# Patient Record
Sex: FEMALE | Race: WHITE | NOT HISPANIC OR LATINO | Employment: OTHER | URBAN - METROPOLITAN AREA
[De-identification: names, ages, dates, MRNs, and addresses within clinical notes are randomized per-mention and may not be internally consistent; named-entity substitution may affect disease eponyms.]

---

## 2019-07-21 ENCOUNTER — HOSPITAL ENCOUNTER (EMERGENCY)
Facility: HOSPITAL | Age: 54
Discharge: HOME | End: 2019-07-21
Attending: EMERGENCY MEDICINE | Admitting: EMERGENCY MEDICINE

## 2019-07-21 ENCOUNTER — APPOINTMENT (OUTPATIENT)
Dept: GENERAL RADIOLOGY | Facility: HOSPITAL | Age: 54
End: 2019-07-21

## 2019-07-21 VITALS
BODY MASS INDEX: 21.57 KG/M2 | RESPIRATION RATE: 14 BRPM | TEMPERATURE: 98.8 F | DIASTOLIC BLOOD PRESSURE: 73 MMHG | HEIGHT: 62 IN | OXYGEN SATURATION: 98 % | HEART RATE: 76 BPM | SYSTOLIC BLOOD PRESSURE: 131 MMHG | WEIGHT: 117.2 LBS

## 2019-07-21 DIAGNOSIS — S42.351A CLOSED DISPLACED COMMINUTED FRACTURE OF SHAFT OF RIGHT HUMERUS, INITIAL ENCOUNTER: Primary | ICD-10-CM

## 2019-07-21 PROCEDURE — 25010000002 HYDROMORPHONE PER 4 MG: Performed by: EMERGENCY MEDICINE

## 2019-07-21 PROCEDURE — 73060 X-RAY EXAM OF HUMERUS: CPT

## 2019-07-21 PROCEDURE — 25010000002 ONDANSETRON PER 1 MG: Performed by: EMERGENCY MEDICINE

## 2019-07-21 PROCEDURE — 96375 TX/PRO/DX INJ NEW DRUG ADDON: CPT

## 2019-07-21 PROCEDURE — 96376 TX/PRO/DX INJ SAME DRUG ADON: CPT

## 2019-07-21 PROCEDURE — 25010000002 HYDROMORPHONE 1 MG/ML SOLUTION: Performed by: EMERGENCY MEDICINE

## 2019-07-21 PROCEDURE — 99284 EMERGENCY DEPT VISIT MOD MDM: CPT

## 2019-07-21 PROCEDURE — 96374 THER/PROPH/DIAG INJ IV PUSH: CPT

## 2019-07-21 RX ORDER — ONDANSETRON 4 MG/1
4 TABLET, ORALLY DISINTEGRATING ORAL ONCE
Status: COMPLETED | OUTPATIENT
Start: 2019-07-21 | End: 2019-07-21

## 2019-07-21 RX ORDER — OXYCODONE HYDROCHLORIDE AND ACETAMINOPHEN 5; 325 MG/1; MG/1
TABLET ORAL
Qty: 20 TABLET | Refills: 0 | Status: SHIPPED | OUTPATIENT
Start: 2019-07-21 | End: 2022-12-01

## 2019-07-21 RX ORDER — HYDROMORPHONE HYDROCHLORIDE 1 MG/ML
0.5 INJECTION, SOLUTION INTRAMUSCULAR; INTRAVENOUS; SUBCUTANEOUS ONCE
Status: COMPLETED | OUTPATIENT
Start: 2019-07-21 | End: 2019-07-21

## 2019-07-21 RX ORDER — ONDANSETRON 4 MG/1
4 TABLET, ORALLY DISINTEGRATING ORAL EVERY 8 HOURS PRN
Qty: 15 TABLET | Refills: 0 | Status: SHIPPED | OUTPATIENT
Start: 2019-07-21

## 2019-07-21 RX ORDER — ONDANSETRON 2 MG/ML
4 INJECTION INTRAMUSCULAR; INTRAVENOUS ONCE
Status: COMPLETED | OUTPATIENT
Start: 2019-07-21 | End: 2019-07-21

## 2019-07-21 RX ORDER — SODIUM CHLORIDE 0.9 % (FLUSH) 0.9 %
10 SYRINGE (ML) INJECTION AS NEEDED
Status: DISCONTINUED | OUTPATIENT
Start: 2019-07-21 | End: 2019-07-21 | Stop reason: HOSPADM

## 2019-07-21 RX ORDER — OXYCODONE HYDROCHLORIDE AND ACETAMINOPHEN 5; 325 MG/1; MG/1
1 TABLET ORAL ONCE
Status: COMPLETED | OUTPATIENT
Start: 2019-07-21 | End: 2019-07-21

## 2019-07-21 RX ADMIN — HYDROMORPHONE HYDROCHLORIDE 1 MG: 1 INJECTION, SOLUTION INTRAMUSCULAR; INTRAVENOUS; SUBCUTANEOUS at 02:06

## 2019-07-21 RX ADMIN — HYDROMORPHONE HYDROCHLORIDE 0.5 MG: 1 INJECTION, SOLUTION INTRAMUSCULAR; INTRAVENOUS; SUBCUTANEOUS at 02:46

## 2019-07-21 RX ADMIN — ONDANSETRON HYDROCHLORIDE 4 MG: 2 SOLUTION INTRAMUSCULAR; INTRAVENOUS at 01:43

## 2019-07-21 RX ADMIN — ONDANSETRON 4 MG: 4 TABLET, ORALLY DISINTEGRATING ORAL at 00:43

## 2019-07-21 RX ADMIN — HYDROMORPHONE HYDROCHLORIDE 0.5 MG: 1 INJECTION, SOLUTION INTRAMUSCULAR; INTRAVENOUS; SUBCUTANEOUS at 03:06

## 2019-07-21 RX ADMIN — OXYCODONE HYDROCHLORIDE AND ACETAMINOPHEN 1 TABLET: 5; 325 TABLET ORAL at 00:43

## 2019-07-21 RX ADMIN — HYDROMORPHONE HYDROCHLORIDE 1 MG: 1 INJECTION, SOLUTION INTRAMUSCULAR; INTRAVENOUS; SUBCUTANEOUS at 01:42

## 2022-03-06 ENCOUNTER — HOSPITAL ENCOUNTER (EMERGENCY)
Facility: HOSPITAL | Age: 57
Discharge: HOME | End: 2022-03-06
Attending: EMERGENCY MEDICINE | Admitting: EMERGENCY MEDICINE

## 2022-03-06 ENCOUNTER — APPOINTMENT (OUTPATIENT)
Dept: GENERAL RADIOLOGY | Facility: HOSPITAL | Age: 57
End: 2022-03-06

## 2022-03-06 VITALS
WEIGHT: 110 LBS | RESPIRATION RATE: 16 BRPM | BODY MASS INDEX: 20.77 KG/M2 | OXYGEN SATURATION: 98 % | HEIGHT: 61 IN | DIASTOLIC BLOOD PRESSURE: 44 MMHG | SYSTOLIC BLOOD PRESSURE: 125 MMHG | TEMPERATURE: 98.6 F | HEART RATE: 70 BPM

## 2022-03-06 DIAGNOSIS — M79.601 RIGHT ARM PAIN: Primary | ICD-10-CM

## 2022-03-06 DIAGNOSIS — R20.2 PARESTHESIA OF RIGHT ARM: ICD-10-CM

## 2022-03-06 PROCEDURE — 73060 X-RAY EXAM OF HUMERUS: CPT

## 2022-03-06 PROCEDURE — 99283 EMERGENCY DEPT VISIT LOW MDM: CPT

## 2022-03-06 RX ORDER — ACETAMINOPHEN 500 MG
1000 TABLET ORAL ONCE
Status: COMPLETED | OUTPATIENT
Start: 2022-03-06 | End: 2022-03-06

## 2022-03-06 RX ADMIN — ACETAMINOPHEN 1000 MG: 500 TABLET ORAL at 16:03

## 2022-03-06 NOTE — ED TRIAGE NOTES
Pt broke right arm in 4 places 3 years ago.  Her arm is misshapped and she reports she has started having more pain and numbness    Patient was placed in face mask during first look triage.  Patient was wearing a face mask throughout encounter.  I wore personal protective equipment throughout the encounter.  Hand hygiene was performed before and after patient encounter.

## 2022-03-06 NOTE — DISCHARGE INSTRUCTIONS
Call Dr. Bell's office tomorrow.  Return to the emergency department for worsening symptoms, weakness in your right arm, or other concern.

## 2022-03-06 NOTE — ED NOTES
This RN in appropriate ppe while in pt room. Pt wearing mask.        Violette Pinto, RN  03/06/22 2603

## 2022-03-06 NOTE — EXTERNAL PATIENT INSTRUCTIONS
Patient Education   Table of Contents       Paresthesia     To view videos and all your education online visit,   [email or web address]   or scan this QR code with your smartphone.                  Paresthesia     Paresthesia is an abnormal burning or prickling sensation. It is usually felt in the hands, arms, legs, or feet. However, it may occur in any part of the body. Usually, paresthesia is not painful. It may feel like:       Tingling or numbness.       Buzzing.       Itching.      Paresthesia may occur without any clear cause, or it may be caused by:       Breathing too quickly (hyperventilation).       Pressure on a nerve.       An underlying medical condition.       Side effects of a medication.       Nutritional deficiencies.       Exposure to toxic chemicals.     Most people experience temporary (transient) paresthesia at some time in their lives. For some people, it may be long-lasting (chronic) because of an underlying medical condition. If you have paresthesia that lasts a long time, you need to be evaluated by your health care provider.   Follow these instructions at home:   Alcohol use           Do not  drink alcohol if:       Your health care provider tells you not to drink.       You are pregnant, may be pregnant, or are planning to become pregnant.      If you drink alcohol:      Limit how much you use to:       0?1 drink a day for women.       0?2 drinks a day for men.       Be aware of how much alcohol is in your drink. In the [place] one drink equals one 12 oz bottle of beer (355 mL), one 5 oz glass of wine (148 mL), or one 1? oz glass of hard liquor (44 mL).     Nutrition           Eat a healthy diet. This includes:       Eating foods that are high in fiber, such as fresh fruits and vegetables, whole grains, and beans.       Limiting foods that are high in fat and processed sugars, such as fried or sweet foods.       General instructions         Take over-the-counter and prescription  medicines only as told by your health care provider.      Do not  use any products that contain nicotine or tobacco, such as cigarettes and e-cigarettes. These can keep blood from reaching damaged nerves. If you need help quitting, ask your health care provider.       If you have diabetes, work closely with your health care provider to keep your blood sugar under control.      If you have numbness in your feet:       Check every day for signs of injury or infection. Watch for redness, warmth, and swelling.       Wear padded socks and comfortable shoes. These help protect your feet.       Keep all follow-up visits as told by your health care provider. This is important.       Contact a health care provider if you:         Have paresthesia that gets worse or does not go away.       Have numbness after an injury.       Have a burning or prickling feeling that gets worse when you walk.       Have pain, cramps, or dizziness, or you faint.       Develop a rash.     Get help right away if you:         Feel muscle weakness.       Develop new weakness in an arm or leg.       Have trouble walking or moving.       Have problems with speech, understanding, or vision.       Feel confused.       Cannot control your bladder or bowel movements.     Summary         Paresthesia is an abnormal burning or prickling sensation that is usually felt in the hands, arms, legs, or feet. It may also occur in other parts of the body.       Paresthesia may occur without any clear cause, or it may be caused by breathing too quickly (hyperventilation), pressure on a nerve, an underlying medical condition, side effects of a medication, nutritional deficiencies, or exposure to toxic chemicals.       If you have paresthesia that lasts a long time, you need to be evaluated by your health care provider.     This information is not intended to replace advice given to you by your health care provider. Make sure you discuss any questions you have with  your health care provider.     Document Released: 12/08/2003Document Revised: 09/28/2021Document Reviewed: 09/28/2021     Elsevier Patient Education ? 2021 [employer]

## 2022-03-06 NOTE — ED PROVIDER NOTES
" EMERGENCY DEPARTMENT ENCOUNTER    Room Number:  35/35  Date of encounter:  3/6/2022  PCP: Waledmar King MD  Historian: Patient     I used full protective equipment while examining this patient.  This includes face mask, gloves and protective eyewear.  I washed my hands before entering the room and immediately upon leaving the room.  Patient was wearing a surgical mask.      HPI:  Chief Complaint: Right arm pain  A complete HPI/ROS/PMH/PSH/SH/FH are unobtainable due to: None    Context: Sharmin Ren is a 56 y.o. female who presents to the ED c/o worsening right arm pain.  Patient broke her right humerus in July 2019.  She wore an immobilizer for approximately 8 months but never had surgery.  She states her arm \"did not heal properly\".  She has had chronic pain since then but reports worsening pain over the past few weeks.  She also reports some numbness/tingling in her right hand for the past few days.  The numbness is waxing and waning.  She is right-handed.  Denies recent injury, neck pain, chest pain, or shortness of breath.      PAST MEDICAL HISTORY  Active Ambulatory Problems     Diagnosis Date Noted   • No Active Ambulatory Problems     Resolved Ambulatory Problems     Diagnosis Date Noted   • No Resolved Ambulatory Problems     No Additional Past Medical History         PAST SURGICAL HISTORY  History reviewed. No pertinent surgical history.      FAMILY HISTORY  History reviewed. No pertinent family history.      SOCIAL HISTORY  Social History     Socioeconomic History   • Marital status:          ALLERGIES  Penicillins       REVIEW OF SYSTEMS  Review of Systems      All systems have been reviewed and are negative except as as discussed in the HPI    PHYSICAL EXAM    I have reviewed the triage vital signs and nursing notes.    ED Triage Vitals   Temp Heart Rate Resp BP SpO2   03/06/22 1409 03/06/22 1409 03/06/22 1409 03/06/22 1413 03/06/22 1409   98.6 °F (37 °C) 86 16 144/63 99 %      Temp src " Heart Rate Source Patient Position BP Location FiO2 (%)   03/06/22 1409 03/06/22 1409 03/06/22 1413 03/06/22 1413 --   Tympanic Monitor Lying Left arm        Physical Exam  GENERAL: Awake, alert, oriented x3.  Well-developed, well-nourished female.  Resting comfortably no acute distress.  HENT: NCAT, nares patent  NECK: supple  EYES: no scleral icterus  CV: regular rhythm, regular rate, normal radial and ulnar pulses bilaterally, brisk cap refill in the right fingers  RESPIRATORY: normal effort, clear to auscultation bilaterally  ABDOMEN: soft, nontender  MUSCULOSKELETAL: There is an obvious deformity of the right humerus.  There is mild tenderness over the right mid humerus.  Remainder of the right arm is nontender.  There is decreased range of motion of the right shoulder (chronic and unchanged per patient).  Right arm is not swollen.  There is no cellulitis or warmth.  NEURO: Normal strength and light touch sensation in the right upper extremity.  Brisk cap refill in the right fingers.  Equal  strength bilaterally.  SKIN: warm, dry, no rash  PSYCH: Normal mood and affect    .    LAB RESULTS  No results found for this or any previous visit (from the past 24 hour(s)).    Ordered the above labs and independently reviewed the results.      RADIOLOGY  XR Humerus Right    Result Date: 3/6/2022  TWO-VIEW RIGHT HUMERUS  HISTORY: Previous fracture of humerus. Pain.  FINDINGS:  There is extensive old fracture deformity just above the mid shaft of the humerus with prominent healing callus formation. The fracture healing is with considerable lateral bowing at the fracture site that appears more prominent compared to the acute phase imaging of 07/21/2019. There is no history of recent trauma and the remainder of the humerus is unremarkable.  This report was finalized on 3/6/2022 4:03 PM by Dr. Schuyler Joya M.D.        I ordered the above noted radiological studies. Reviewed by me and discussed with radiologist.  See  dictation for official radiology interpretation.      PROCEDURES  Procedures      MEDICATIONS GIVEN IN ER    Medications   acetaminophen (TYLENOL) tablet 1,000 mg (1,000 mg Oral Given 3/6/22 1603)         PROGRESS, DATA ANALYSIS, CONSULTS, AND MEDICAL DECISION MAKING    All labs have been independently reviewed by me.  All radiology studies have been reviewed by me and discussed with radiologist dictating the report.   EKG's independently viewed and interpreted by me.  I have reviewed the nurse's notes, vital signs, past medical history, and medication list.  Discussion below represents my analysis of pertinent findings related to patient's condition, differential diagnosis, treatment plan and final disposition.      ED Course as of 03/06/22 1633   Sun Mar 06, 2022   1443 Old records reviewed.  Patient was seen here in July 2019 for a comminuted fracture of the right humerus. [WH]   1533 Right humerus x-rays interpreted by the radiologist.  Images are not reviewed by me.  There is an extensive old fracture deformity of the proximal right humerus.  Fragments are overriding by approximately 5 cm.  There is callus formation.  No acute fracture.  See dictated report for details. [WH]   1603 I showed the patient her x-rays and discussed the results with her.  Patient will be referred to orthopedics for follow-up. [WH]      ED Course User Index  [WH] Shoaib Quintero MD       AS OF 16:33 EST VITALS:    BP - 125/44  HR - 70  TEMP - 98.6 °F (37 °C) (Tympanic)  O2 SATS - 98%      DIAGNOSIS  Final diagnoses:   Right arm pain   Paresthesia of right arm         DISPOSITION  Discharge    DISCHARGE    Patient discharged in stable condition.    Reviewed implications of results, diagnosis, meds, responsibility to follow up, warning signs and symptoms of possible worsening, potential complications and reasons to return to ER, including worsening symptoms, weakness or swelling in her right arm, or other concern..    Patient/Family  voiced understanding of above instructions.    Discussed plan for discharge, as there is no emergent indication for admission. Patient referred to primary care provider for BP management due to today's BP. Pt/family is agreeable and understands need for follow up and repeat testing.  Pt is aware that discharge does not mean that nothing is wrong but it indicates no emergency is present that requires admission and they must continue care with follow-up as given below or physician of their choice.     FOLLOW-UP  Verónica Bell MD  [address] [facility] [address] [facility] [address]  [place] [address]  [phone]    Call in 1 day  Old right humerus fracture that did not heal properly         Medication List      No changes were made to your prescriptions during this visit.           Dictated utilizing Dragon dictation:  Much of this encounter note is an electronic transcription/translation of spoken language to printed text. The electronic translation of spoken language may permit erroneous, or at times, nonsensical words or phrases to be inadvertently transcribed; Although I have reviewed the note for such errors, some may still exist.     Shoaib Quintero MD  03/06/22 5285       Shoaib Quintero MD  03/06/22 5802

## 2022-12-01 ENCOUNTER — OFFICE VISIT (OUTPATIENT)
Dept: FAMILY MEDICINE CLINIC | Facility: CLINIC | Age: 57
End: 2022-12-01

## 2022-12-01 VITALS
HEIGHT: 61 IN | DIASTOLIC BLOOD PRESSURE: 74 MMHG | BODY MASS INDEX: 20.77 KG/M2 | TEMPERATURE: 97.8 F | WEIGHT: 110 LBS | SYSTOLIC BLOOD PRESSURE: 132 MMHG | OXYGEN SATURATION: 99 % | HEART RATE: 79 BPM

## 2022-12-01 DIAGNOSIS — D64.9 ANEMIA, UNSPECIFIED TYPE: ICD-10-CM

## 2022-12-01 DIAGNOSIS — Z12.11 COLON CANCER SCREENING: ICD-10-CM

## 2022-12-01 DIAGNOSIS — Z00.00 ENCOUNTER FOR ANNUAL HEALTH EXAMINATION: Primary | ICD-10-CM

## 2022-12-01 DIAGNOSIS — F41.1 GAD (GENERALIZED ANXIETY DISORDER): ICD-10-CM

## 2022-12-01 DIAGNOSIS — Z78.0 POST-MENOPAUSAL: ICD-10-CM

## 2022-12-01 PROBLEM — S42.309A CLOSED FRACTURE OF SHAFT OF HUMERUS: Status: ACTIVE | Noted: 2022-06-15

## 2022-12-01 PROBLEM — Z13.6 ENCOUNTER FOR LIPID SCREENING FOR CARDIOVASCULAR DISEASE: Status: ACTIVE | Noted: 2022-12-01

## 2022-12-01 PROBLEM — Z13.220 ENCOUNTER FOR LIPID SCREENING FOR CARDIOVASCULAR DISEASE: Status: ACTIVE | Noted: 2022-12-01

## 2022-12-01 PROCEDURE — 99386 PREV VISIT NEW AGE 40-64: CPT | Performed by: FAMILY MEDICINE

## 2022-12-01 RX ORDER — DOXYCYCLINE HYCLATE 50 MG/1
324 CAPSULE, GELATIN COATED ORAL DAILY
COMMUNITY

## 2022-12-01 RX ORDER — MONTELUKAST SODIUM 10 MG/1
10 TABLET ORAL AS NEEDED
COMMUNITY
Start: 2022-08-27

## 2022-12-01 RX ORDER — CALCIUM CARBONATE/VITAMIN D3 500MG-5MCG
1 TABLET ORAL DAILY
COMMUNITY

## 2022-12-01 RX ORDER — ESTRADIOL 0.5 MG/1
0.5 TABLET ORAL DAILY
COMMUNITY
Start: 2022-11-30

## 2022-12-01 RX ORDER — DIPHENOXYLATE HYDROCHLORIDE AND ATROPINE SULFATE 2.5; .025 MG/1; MG/1
1 TABLET ORAL DAILY
COMMUNITY

## 2022-12-01 RX ORDER — PROGESTERONE 100 MG/1
100 CAPSULE ORAL DAILY
COMMUNITY
Start: 2022-11-30

## 2022-12-01 RX ORDER — FLUTICASONE PROPIONATE 50 MCG
1 SPRAY, SUSPENSION (ML) NASAL AS NEEDED
COMMUNITY
Start: 2022-08-27

## 2022-12-01 RX ORDER — BUPROPION HYDROCHLORIDE 300 MG/1
300 TABLET ORAL DAILY
COMMUNITY
Start: 2022-09-30

## 2022-12-01 RX ORDER — FLUOXETINE HYDROCHLORIDE 20 MG/1
20 CAPSULE ORAL DAILY
COMMUNITY
Start: 2022-11-16

## 2022-12-01 RX ORDER — AZELASTINE HYDROCHLORIDE 137 UG/1
1 SPRAY, METERED NASAL AS NEEDED
COMMUNITY
Start: 2022-08-27

## 2022-12-01 RX ORDER — OXYCODONE AND ACETAMINOPHEN 7.5; 325 MG/1; MG/1
1 TABLET ORAL AS NEEDED
COMMUNITY
Start: 2022-11-28 | End: 2022-12-28

## 2022-12-01 NOTE — PROGRESS NOTES
Patient here for annual physical exam    Subjective   Sharmin Ren is a 57 y.o. female.     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    The following portions of the patient's history were reviewed and updated as appropriate: allergies, current medications, past family history, past medical history, past social history, past surgical history and problem list    Dentist: [facility].  Last colonoscopy: Pt never has had.   Pap; just done.    Optometry:needed.  Last PSA(if applicable):  Last mammo(if applicable):utd    Immunization History   Administered Date(s) Administered   • COVID-19 (PFIZER) PURPLE CAP 04/19/2021, 05/10/2021   • Tdap 07/11/2022       Review of Systems   Constitutional: Negative for appetite change and fatigue.   HENT: Negative for nosebleeds and sore throat.    Eyes: Negative for blurred vision and visual disturbance.   Respiratory: Negative for shortness of breath and wheezing.    Cardiovascular: Negative for chest pain and leg swelling.   Gastrointestinal: Negative for abdominal distention and abdominal pain.   Endocrine: Negative for cold intolerance and polyuria.   Genitourinary: Negative for dysuria and hematuria.   Musculoskeletal: Negative for arthralgias and myalgias.   Skin: Negative for color change and rash.   Neurological: Negative for weakness and confusion.   Psychiatric/Behavioral: Negative for agitation and depressed mood.       Patient Active Problem List   Diagnosis   • Encounter for annual health examination   • EDYTA (generalized anxiety disorder)   • Closed fracture of shaft of humerus   • Post-menopausal   • Encounter for lipid screening for cardiovascular disease       Allergies   Allergen Reactions   • Penicillins Shortness Of Breath   • Wasp Venom Swelling     Throat Swells         Current Outpatient Medications:   •  Azelastine HCl 137 MCG/SPRAY solution, 1 spray into the nostril(s) as directed by provider As Needed., Disp: , Rfl:   •  buPROPion XL (WELLBUTRIN XL)  300 MG 24 hr tablet, Take 300 mg by mouth Daily., Disp: , Rfl:   •  Calcium Carb-Cholecalciferol 500-5 MG-MCG tablet per tablet, Take 1 tablet by mouth Daily., Disp: , Rfl:   •  estradiol (ESTRACE) 0.5 MG tablet, Take 0.5 mg by mouth Daily., Disp: , Rfl:   •  ferrous gluconate (FERGON) 324 MG tablet, Take 324 mg by mouth Daily., Disp: , Rfl:   •  FLUoxetine (PROzac) 20 MG capsule, Take 20 mg by mouth Daily., Disp: , Rfl:   •  fluticasone (FLONASE) 50 MCG/ACT nasal spray, 1 spray into the nostril(s) as directed by provider As Needed., Disp: , Rfl:   •  montelukast (SINGULAIR) 10 MG tablet, Take 10 mg by mouth As Needed for Other. When sick, Disp: , Rfl:   •  multivitamin (THERAGRAN) tablet tablet, Take 1 tablet by mouth Daily., Disp: , Rfl:   •  ondansetron ODT (ZOFRAN ODT) 4 MG disintegrating tablet, Take 1 tablet by mouth Every 8 (Eight) Hours As Needed for Nausea., Disp: 15 tablet, Rfl: 0  •  oxyCODONE-acetaminophen (PERCOCET) 7.5-325 MG per tablet, Take 1 tablet by mouth As Needed., Disp: , Rfl:   •  Progesterone (PROMETRIUM) 100 MG capsule, Take 100 mg by mouth Daily., Disp: , Rfl:     History reviewed. No pertinent past medical history.    No past surgical history on file.    History reviewed. No pertinent family history.    Social History     Tobacco Use   • Smoking status: Never   • Smokeless tobacco: Never   Substance Use Topics   • Alcohol use: Not on file            Objective     Vitals:    12/01/22 1116   BP: 132/74   Pulse: 79   Temp: 97.8 °F (36.6 °C)   SpO2: 99%     Body mass index is 20.8 kg/m².    Physical Exam  Vitals reviewed.   Constitutional:       Appearance: She is well-developed. She is not diaphoretic.   HENT:      Head: Normocephalic and atraumatic.   Eyes:      General: No scleral icterus.     Pupils: Pupils are equal, round, and reactive to light.   Neck:      Thyroid: No thyromegaly.   Cardiovascular:      Rate and Rhythm: Normal rate and regular rhythm.      Heart sounds: No murmur  heard.    No friction rub. No gallop.   Pulmonary:      Effort: Pulmonary effort is normal. No respiratory distress.      Breath sounds: No wheezing or rales.   Chest:      Chest wall: No tenderness.   Abdominal:      General: Bowel sounds are normal. There is no distension.      Palpations: Abdomen is soft.      Tenderness: There is no abdominal tenderness.   Musculoskeletal:         General: No deformity. Normal range of motion.   Lymphadenopathy:      Cervical: No cervical adenopathy.   Skin:     General: Skin is warm and dry.      Findings: No rash.   Neurological:      Cranial Nerves: No cranial nerve deficit.      Motor: No abnormal muscle tone.         No results found for: GLUCOSE, BUN, CREATININE, EGFRIFNONA, EGFRIFAFRI, BCR, K, CO2, CALCIUM, PROTENTOTREF, ALBUMIN, LABIL2, BILIRUBIN, AST, ALT    Hemoglobin   Date Value Ref Range Status   11/21/2022 12.7 12.0 - 16.0 g/dL Final     Hematocrit   Date Value Ref Range Status   11/21/2022 36.0 36.0 - 46.0 % Final       No results found for: HGBA1C    No results found for: RYPFFEAC53    No results found for: TSH    No results found for: CHOL  No results found for: TRIG  No results found for: HDL  No results found for: LDL  No results found for: VLDL  No results found for: LDLHDL      Procedures    Assessment & Plan   Problems Addressed this Visit     Encounter for annual health examination - Primary    Encounter for lipid screening for cardiovascular disease    EDYTA (generalized anxiety disorder)    Relevant Medications    FLUoxetine (PROzac) 20 MG capsule    buPROPion XL (WELLBUTRIN XL) 300 MG 24 hr tablet    Post-menopausal   Other Visit Diagnoses     Anemia, unspecified type        Relevant Medications    ferrous gluconate (FERGON) 324 MG tablet      Diagnoses       Codes Comments    Encounter for annual health examination    -  Primary ICD-10-CM: Z00.00  ICD-9-CM: V70.0     EDYTA (generalized anxiety disorder)     ICD-10-CM: F41.1  ICD-9-CM: 300.02      Post-menopausal     ICD-10-CM: Z78.0  ICD-9-CM: V49.81     Colon cancer screening     ICD-10-CM: Z12.11  ICD-9-CM: V76.51     Anemia, unspecified type     ICD-10-CM: D64.9  ICD-9-CM: 285.9         Anemia. Hg noted to be 10 on 11/22 labs with normal mcv.  Check Fe, ferritin, CBC, TSh.    Preventive Counseling:  Encouraged to stay active.  Covid vaccine UTD.  HEP A UTD.  Pneumovax UTD.  Cologuard ordered.  Dexa ordered. Check CMP, FLP.      Dentist UTD.  Optho recommended.     No orders of the defined types were placed in this encounter.      Current Outpatient Medications   Medication Sig Dispense Refill   • Azelastine HCl 137 MCG/SPRAY solution 1 spray into the nostril(s) as directed by provider As Needed.     • buPROPion XL (WELLBUTRIN XL) 300 MG 24 hr tablet Take 300 mg by mouth Daily.     • Calcium Carb-Cholecalciferol 500-5 MG-MCG tablet per tablet Take 1 tablet by mouth Daily.     • estradiol (ESTRACE) 0.5 MG tablet Take 0.5 mg by mouth Daily.     • ferrous gluconate (FERGON) 324 MG tablet Take 324 mg by mouth Daily.     • FLUoxetine (PROzac) 20 MG capsule Take 20 mg by mouth Daily.     • fluticasone (FLONASE) 50 MCG/ACT nasal spray 1 spray into the nostril(s) as directed by provider As Needed.     • montelukast (SINGULAIR) 10 MG tablet Take 10 mg by mouth As Needed for Other. When sick     • multivitamin (THERAGRAN) tablet tablet Take 1 tablet by mouth Daily.     • ondansetron ODT (ZOFRAN ODT) 4 MG disintegrating tablet Take 1 tablet by mouth Every 8 (Eight) Hours As Needed for Nausea. 15 tablet 0   • oxyCODONE-acetaminophen (PERCOCET) 7.5-325 MG per tablet Take 1 tablet by mouth As Needed.     • Progesterone (PROMETRIUM) 100 MG capsule Take 100 mg by mouth Daily.       No current facility-administered medications for this visit.       Return in about 6 months (around 6/1/2023).    There are no Patient Instructions on file for this visit.

## 2022-12-02 LAB
ALBUMIN SERPL-MCNC: 4.5 G/DL (ref 3.5–5.2)
ALBUMIN/GLOB SERPL: 2 G/DL
ALP SERPL-CCNC: 160 U/L (ref 39–117)
ALT SERPL-CCNC: 47 U/L (ref 1–33)
AST SERPL-CCNC: 32 U/L (ref 1–32)
BASOPHILS # BLD AUTO: 0.05 10*3/MM3 (ref 0–0.2)
BASOPHILS NFR BLD AUTO: 0.7 % (ref 0–1.5)
BILIRUB SERPL-MCNC: 0.3 MG/DL (ref 0–1.2)
BUN SERPL-MCNC: 11 MG/DL (ref 6–20)
BUN/CREAT SERPL: 16.4 (ref 7–25)
CALCIUM SERPL-MCNC: 9.8 MG/DL (ref 8.6–10.5)
CHLORIDE SERPL-SCNC: 103 MMOL/L (ref 98–107)
CHOLEST SERPL-MCNC: 230 MG/DL (ref 0–200)
CHOLEST/HDLC SERPL: 3.83 {RATIO}
CO2 SERPL-SCNC: 25.8 MMOL/L (ref 22–29)
CREAT SERPL-MCNC: 0.67 MG/DL (ref 0.57–1)
EGFRCR SERPLBLD CKD-EPI 2021: 102.7 ML/MIN/1.73
EOSINOPHIL # BLD AUTO: 0.1 10*3/MM3 (ref 0–0.4)
EOSINOPHIL NFR BLD AUTO: 1.4 % (ref 0.3–6.2)
ERYTHROCYTE [DISTWIDTH] IN BLOOD BY AUTOMATED COUNT: 11.6 % (ref 12.3–15.4)
FERRITIN SERPL-MCNC: 305 NG/ML (ref 13–150)
GLOBULIN SER CALC-MCNC: 2.3 GM/DL
GLUCOSE SERPL-MCNC: 87 MG/DL (ref 65–99)
HCT VFR BLD AUTO: 33.4 % (ref 34–46.6)
HDLC SERPL-MCNC: 60 MG/DL (ref 40–60)
HGB BLD-MCNC: 11.5 G/DL (ref 12–15.9)
IMM GRANULOCYTES # BLD AUTO: 0.04 10*3/MM3 (ref 0–0.05)
IMM GRANULOCYTES NFR BLD AUTO: 0.6 % (ref 0–0.5)
IRON SERPL-MCNC: 110 MCG/DL (ref 37–145)
LDLC SERPL CALC-MCNC: 149 MG/DL (ref 0–100)
LYMPHOCYTES # BLD AUTO: 2.18 10*3/MM3 (ref 0.7–3.1)
LYMPHOCYTES NFR BLD AUTO: 31.5 % (ref 19.6–45.3)
MCH RBC QN AUTO: 32.7 PG (ref 26.6–33)
MCHC RBC AUTO-ENTMCNC: 34.4 G/DL (ref 31.5–35.7)
MCV RBC AUTO: 94.9 FL (ref 79–97)
MONOCYTES # BLD AUTO: 0.57 10*3/MM3 (ref 0.1–0.9)
MONOCYTES NFR BLD AUTO: 8.2 % (ref 5–12)
NEUTROPHILS # BLD AUTO: 3.97 10*3/MM3 (ref 1.7–7)
NEUTROPHILS NFR BLD AUTO: 57.6 % (ref 42.7–76)
NRBC BLD AUTO-RTO: 0 /100 WBC (ref 0–0.2)
PLATELET # BLD AUTO: 500 10*3/MM3 (ref 140–450)
POTASSIUM SERPL-SCNC: 5.1 MMOL/L (ref 3.5–5.2)
PROT SERPL-MCNC: 6.8 G/DL (ref 6–8.5)
RBC # BLD AUTO: 3.52 10*6/MM3 (ref 3.77–5.28)
SODIUM SERPL-SCNC: 138 MMOL/L (ref 136–145)
TRIGL SERPL-MCNC: 116 MG/DL (ref 0–150)
TSH SERPL DL<=0.005 MIU/L-ACNC: 3.96 UIU/ML (ref 0.27–4.2)
VIT B12 SERPL-MCNC: >2000 PG/ML (ref 211–946)
VLDLC SERPL CALC-MCNC: 21 MG/DL (ref 5–40)
WBC # BLD AUTO: 6.91 10*3/MM3 (ref 3.4–10.8)

## 2024-12-31 ENCOUNTER — TELEPHONE (OUTPATIENT)
Dept: FAMILY MEDICINE CLINIC | Facility: CLINIC | Age: 59
End: 2024-12-31

## 2024-12-31 DIAGNOSIS — Z01.818 PREOPERATIVE CLEARANCE: Primary | ICD-10-CM

## 2024-12-31 NOTE — TELEPHONE ENCOUNTER
Caller: Sharmin Ren    Relationship: Self    Best call back number: [phone]     What is the medical concern/diagnosis: CARDIAC CLEARANCE FOR SURGERY ON 01/3/25    OR IF CARDIAC CLEARANCE CAN BE DONE AT THE OFFICE CAN THE PATIENT BE CALLED TO SCHEDULE    What specialty or service is being requested: CARDIOLOGY      Any additional details: PATIENTS ANESTHESIOLOGIST HAD DECIDED THEY WANT A CARDIAC CLEARANCE FOR HER SURGERY.     PATIENT WOULD NEED TO GET IN TO CARDIOLOGIST BEFORE 1/3/25    PLEASE CALL TO ADVISE

## 2025-01-02 ENCOUNTER — OFFICE VISIT (OUTPATIENT)
Dept: CARDIOLOGY | Facility: CLINIC | Age: 60
End: 2025-01-02
Payer: COMMERCIAL

## 2025-01-02 VITALS
HEIGHT: 60 IN | SYSTOLIC BLOOD PRESSURE: 140 MMHG | WEIGHT: 111 LBS | HEART RATE: 67 BPM | BODY MASS INDEX: 21.79 KG/M2 | DIASTOLIC BLOOD PRESSURE: 82 MMHG

## 2025-01-02 DIAGNOSIS — Z01.810 PREOP CARDIOVASCULAR EXAM: Primary | ICD-10-CM

## 2025-01-02 DIAGNOSIS — Z87.74 S/P PERCUTANEOUS PATENT FORAMEN OVALE CLOSURE: ICD-10-CM

## 2025-01-02 DIAGNOSIS — R03.0 ELEVATED BLOOD PRESSURE READING IN OFFICE WITHOUT DIAGNOSIS OF HYPERTENSION: ICD-10-CM

## 2025-01-02 PROCEDURE — 93000 ELECTROCARDIOGRAM COMPLETE: CPT | Performed by: STUDENT IN AN ORGANIZED HEALTH CARE EDUCATION/TRAINING PROGRAM

## 2025-01-02 PROCEDURE — 99204 OFFICE O/P NEW MOD 45 MIN: CPT | Performed by: STUDENT IN AN ORGANIZED HEALTH CARE EDUCATION/TRAINING PROGRAM

## 2025-01-02 NOTE — PROGRESS NOTES
[facility]    Subjective:     Encounter Date:01/02/25      Patient ID: Sharmin Ren is a 59 y.o. female.    Chief Complaint:   Chief Complaint   Patient presents with    Clearance      History of Present Illness    Sharmin Ren is a 59 y.o. lady who presents for further evaluation.  She has a past history of what sounds to be a TIA episode in the late 1990s, and ultimately underwent evaluation which revealed a PFO.  She underwent a transcutaneous PFO closure with Dr. Miller at that time.  She subsequently did well.  She has no other cardiac complaints.  She had a close displaced spiral fracture of the right humerus.  She was seeing orthopedic surgery for this.  She underwent an ORIFOf the right humerus in November 2022.  She tolerated the procedure well.  Her initial fracture event was in 2019.    She presents today for further cardiac evaluation prior to undergoing an elective breast augmentation and c section scar repair/cosmetic surgery.  She has no cardiac complaints.  She is active, functional, and has no exertional shortness of breath.  She is able to exceed 4 METS of physical exertion without any functional imitations.  She has no palpitations otherwise feels well.  She is no longer on antiplatelets    Previous Cardiac Testing:  PFO closure in the late 1990's    The following portions of the patient's history were reviewed and updated as appropriate: allergies, current medications, past family history, past medical history, past social history, past surgical history and problem list.    History reviewed. No pertinent past medical history.    History reviewed. No pertinent surgical history.        ECG 12 Lead    Date/Time: 1/2/2025 8:15 AM  Performed by: Yohannes Worrell MD    Authorized by: Yohannes Worrell MD  Comparison: not compared with previous ECG   Previous ECG: no previous ECG available  Rhythm: sinus rhythm  Rate: normal  Conduction: conduction normal  ST Segments: ST segments  "normal  ST Flattening: V6, V5 and III  T Waves: T waves normal  QRS axis: normal  Other: no other findings    Clinical impression: normal ECG  Comments: Short [place] interval, otherwise normal ECG.  There is no evidence of delta waves or other pathology noted.  Borderline nonspecific ST segment changes noted.             Objective:     Vitals:    01/02/25 0811   BP: 140/82   BP Location: Left arm   Patient Position: Sitting   Cuff Size: Adult   Pulse: 67   Weight: 50.3 kg (111 lb)   Height: 152.4 cm (60\")         Constitutional:       Appearance: Healthy appearance. Not in distress.   Neck:      Vascular: JVD normal.   Pulmonary:      Effort: Pulmonary effort is normal.      Breath sounds: Normal breath sounds.   Cardiovascular:      PMI at left midclavicular line. Normal rate. Regular rhythm. Normal S2.       Murmurs: There is no murmur.   Pulses:     Intact distal pulses.   Edema:     Peripheral edema absent.   Skin:     General: Skin is warm and dry.   Neurological:      General: No focal deficit present.      Mental Status: Alert, oriented to person, place, and time and oriented to person, place and time.   Psychiatric:         Mood and Affect: Mood and affect normal.         Lab Review:       BUN   Date Value Ref Range Status   12/01/2022 11 6 - 20 mg/dL Final   11/14/2022 14 10 - 20 mg/dL Final     Creatinine   Date Value Ref Range Status   12/01/2022 0.67 0.57 - 1.00 mg/dL Final   11/14/2022 0.64 0.55 - 1.02 mg/dL Final     Potassium   Date Value Ref Range Status   12/01/2022 5.1 3.5 - 5.2 mmol/L Final   11/14/2022 4 3.5 - 5.1 mmol/L Final     ALT (SGPT)   Date Value Ref Range Status   12/01/2022 47 (H) 1 - 33 U/L Final     AST (SGOT)   Date Value Ref Range Status   12/01/2022 32 1 - 32 U/L Final         Performed        Assessment:          Diagnosis Plan   1. Preop cardiovascular exam  ECG 12 Lead      2. Elevated blood pressure reading in office without diagnosis of hypertension  ECG 12 Lead      3. S/P " percutaneous patent foramen ovale closure  Adult Transthoracic Echo Complete w/ Color, Spectral and Contrast if necessary per protocol             Plan:         Preoperative cardiovascular risk assessment for upcoming cosmetic surgery: She is low risk for preoperative M ACE.  She is functional, active, and her ECG is normal.  She does not meet guideline recommendations for additional cardiac testing at this time.  She may proceed with surgery without additional cardiac testing.  She is not on anticoagulants.  History of PFO closure: Sounds that this was a TIA-related event.  Operative details are not available to review due to age.  Will arrange for an echocardiogram to establish a baseline.  If it looks good, then given how long ago the PFO closure was I doubt she will need further echocardiograms we will see what the echo shows.  This echo has no bearing on her surgical risk.     Thank you for allowing me to participate in the care of Sharmin Ren. Feel free to contact me directly with any further questions or concerns.    RTC 1 year, echo in the interim    Yohannes Worrell MD  [facility]  01/02/25  08:11 EST       Current Outpatient Medications:     buPROPion XL (WELLBUTRIN XL) 300 MG 24 hr tablet, Take 300 mg by mouth Daily., Disp: , Rfl:     estradiol (ESTRACE) 0.5 MG tablet, Take 0.5 mg by mouth Daily., Disp: , Rfl:     Progesterone (PROMETRIUM) 100 MG capsule, Take 100 mg by mouth Daily., Disp: , Rfl:          Return in about 1 year (around 1/2/2026).      Part of this note may be an electronic transcription/translation of spoken language to printed text using the Dragon Dictation System.

## 2025-01-02 NOTE — LETTER
January 2, 2025     Ryan Hill MD  [address] [address]  [place] [address]    Patient: Sharmin Ren   YOB: 1965   Date of Visit: 1/2/2025     Dear Ryan Hill MD:       Thank you for referring Sharmin Ren to me for evaluation. Below are the relevant portions of my assessment and plan of care.    If you have questions, please do not hesitate to call me. I look forward to following Sharmin along with you.         Sincerely,        Yohannes Worrell MD        CC: No Recipients    Yohannes Worrell MD  01/02/25 0835  Signed        [facility]    Subjective:     Encounter Date:01/02/25      Patient ID: Sharmin Ren is a 59 y.o. female.    Chief Complaint:   Chief Complaint   Patient presents with   • Clearance      History of Present Illness    Sharmin Ren is a 59 y.o. lady who presents for further evaluation.  She has a past history of what sounds to be a TIA episode in the late 1990s, and ultimately underwent evaluation which revealed a PFO.  She underwent a transcutaneous PFO closure with Dr. Miller at that time.  She subsequently did well.  She has no other cardiac complaints.  She had a close displaced spiral fracture of the right humerus.  She was seeing orthopedic surgery for this.  She underwent an ORIFOf the right humerus in November 2022.  She tolerated the procedure well.  Her initial fracture event was in 2019.    She presents today for further cardiac evaluation prior to undergoing an elective breast augmentation and c section scar repair/cosmetic surgery.  She has no cardiac complaints.  She is active, functional, and has no exertional shortness of breath.  She is able to exceed 4 METS of physical exertion without any functional imitations.  She has no palpitations otherwise feels well.  She is no longer on antiplatelets    Previous Cardiac Testing:  PFO closure in the late 1990's    The following portions of the patient's history were reviewed and updated as  "appropriate: allergies, current medications, past family history, past medical history, past social history, past surgical history and problem list.    History reviewed. No pertinent past medical history.    History reviewed. No pertinent surgical history.        ECG 12 Lead    Date/Time: 1/2/2025 8:15 AM  Performed by: Yohannes Worrell MD    Authorized by: Yohannes Worrell MD  Comparison: not compared with previous ECG   Previous ECG: no previous ECG available  Rhythm: sinus rhythm  Rate: normal  Conduction: conduction normal  ST Segments: ST segments normal  ST Flattening: V6, V5 and III  T Waves: T waves normal  QRS axis: normal  Other: no other findings    Clinical impression: normal ECG  Comments: Short [place] interval, otherwise normal ECG.  There is no evidence of delta waves or other pathology noted.  Borderline nonspecific ST segment changes noted.             Objective:     Vitals:    01/02/25 0811   BP: 140/82   BP Location: Left arm   Patient Position: Sitting   Cuff Size: Adult   Pulse: 67   Weight: 50.3 kg (111 lb)   Height: 152.4 cm (60\")         Constitutional:       Appearance: Healthy appearance. Not in distress.   Neck:      Vascular: JVD normal.   Pulmonary:      Effort: Pulmonary effort is normal.      Breath sounds: Normal breath sounds.   Cardiovascular:      PMI at left midclavicular line. Normal rate. Regular rhythm. Normal S2.       Murmurs: There is no murmur.   Pulses:     Intact distal pulses.   Edema:     Peripheral edema absent.   Skin:     General: Skin is warm and dry.   Neurological:      General: No focal deficit present.      Mental Status: Alert, oriented to person, place, and time and oriented to person, place and time.   Psychiatric:         Mood and Affect: Mood and affect normal.         Lab Review:       BUN   Date Value Ref Range Status   12/01/2022 11 6 - 20 mg/dL Final   11/14/2022 14 10 - 20 mg/dL Final     Creatinine   Date Value Ref Range Status   12/01/2022 0.67 0.57 - 1.00 " mg/dL Final   11/14/2022 0.64 0.55 - 1.02 mg/dL Final     Potassium   Date Value Ref Range Status   12/01/2022 5.1 3.5 - 5.2 mmol/L Final   11/14/2022 4 3.5 - 5.1 mmol/L Final     ALT (SGPT)   Date Value Ref Range Status   12/01/2022 47 (H) 1 - 33 U/L Final     AST (SGOT)   Date Value Ref Range Status   12/01/2022 32 1 - 32 U/L Final         Performed        Assessment:          Diagnosis Plan   1. Preop cardiovascular exam  ECG 12 Lead      2. Elevated blood pressure reading in office without diagnosis of hypertension  ECG 12 Lead      3. S/P percutaneous patent foramen ovale closure  Adult Transthoracic Echo Complete w/ Color, Spectral and Contrast if necessary per protocol             Plan:         Preoperative cardiovascular risk assessment for upcoming cosmetic surgery: She is low risk for preoperative M ACE.  She is functional, active, and her ECG is normal.  She does not meet guideline recommendations for additional cardiac testing at this time.  She may proceed with surgery without additional cardiac testing.  She is not on anticoagulants.  History of PFO closure: Sounds that this was a TIA-related event.  Operative details are not available to review due to age.  Will arrange for an echocardiogram to establish a baseline.  If it looks good, then given how long ago the PFO closure was I doubt she will need further echocardiograms we will see what the echo shows.  This echo has no bearing on her surgical risk.     Thank you for allowing me to participate in the care of Sharmin Ren. Feel free to contact me directly with any further questions or concerns.    RTC 1 year, echo in the interim    Yohannes Worrell MD  [facility]  01/02/25  08:11 EST       Current Outpatient Medications:   •  buPROPion XL (WELLBUTRIN XL) 300 MG 24 hr tablet, Take 300 mg by mouth Daily., Disp: , Rfl:   •  estradiol (ESTRACE) 0.5 MG tablet, Take 0.5 mg by mouth Daily., Disp: , Rfl:   •  Progesterone (PROMETRIUM) 100 MG  capsule, Take 100 mg by mouth Daily., Disp: , Rfl:          Return in about 1 year (around 1/2/2026).      Part of this note may be an electronic transcription/translation of spoken language to printed text using the Dragon Dictation System.

## 2025-01-02 NOTE — LETTER
January 2, 2025     [facility]  [address] [facility] [id number]    Patient: Sharmin Ren   YOB: 1965   Date of Visit: 1/2/2025     Dear [facility]:       Thank you for referring Sharmin Ren to me for evaluation. Below are the relevant portions of my assessment and plan of care.    If you have questions, please do not hesitate to call me. I look forward to following Sharmin along with you.         Sincerely,        Yohannes Worrell MD        CC: MD Gm Robles, Yohannes HUTCHINS MD  01/02/25 0835  Sign when Signing Visit        [facility]    Subjective:     Encounter Date:01/02/25      Patient ID: Sharmin Ren is a 59 y.o. female.    Chief Complaint:   Chief Complaint   Patient presents with   • Clearance      History of Present Illness    Sharmin Ren is a 59 y.o. lady who presents for further evaluation.  She has a past history of what sounds to be a TIA episode in the late 1990s, and ultimately underwent evaluation which revealed a PFO.  She underwent a transcutaneous PFO closure with Dr. Miller at that time.  She subsequently did well.  She has no other cardiac complaints.  She had a close displaced spiral fracture of the right humerus.  She was seeing orthopedic surgery for this.  She underwent an ORIFOf the right humerus in November 2022.  She tolerated the procedure well.  Her initial fracture event was in 2019.    She presents today for further cardiac evaluation prior to undergoing an elective breast augmentation and c section scar repair/cosmetic surgery.  She has no cardiac complaints.  She is active, functional, and has no exertional shortness of breath.  She is able to exceed 4 METS of physical exertion without any functional imitations.  She has no palpitations otherwise feels well.  She is no longer on antiplatelets    Previous Cardiac Testing:  PFO closure in the late 1990's    The  "following portions of the patient's history were reviewed and updated as appropriate: allergies, current medications, past family history, past medical history, past social history, past surgical history and problem list.    History reviewed. No pertinent past medical history.    History reviewed. No pertinent surgical history.        ECG 12 Lead    Date/Time: 1/2/2025 8:15 AM  Performed by: Yohannes Worrell MD    Authorized by: Yohannes Worrell MD  Comparison: not compared with previous ECG   Previous ECG: no previous ECG available  Rhythm: sinus rhythm  Rate: normal  Conduction: conduction normal  ST Segments: ST segments normal  ST Flattening: V6, V5 and III  T Waves: T waves normal  QRS axis: normal  Other: no other findings    Clinical impression: normal ECG  Comments: Short [place] interval, otherwise normal ECG.  There is no evidence of delta waves or other pathology noted.  Borderline nonspecific ST segment changes noted.             Objective:     Vitals:    01/02/25 0811   BP: 140/82   BP Location: Left arm   Patient Position: Sitting   Cuff Size: Adult   Pulse: 67   Weight: 50.3 kg (111 lb)   Height: 152.4 cm (60\")         Constitutional:       Appearance: Healthy appearance. Not in distress.   Neck:      Vascular: JVD normal.   Pulmonary:      Effort: Pulmonary effort is normal.      Breath sounds: Normal breath sounds.   Cardiovascular:      PMI at left midclavicular line. Normal rate. Regular rhythm. Normal S2.       Murmurs: There is no murmur.   Pulses:     Intact distal pulses.   Edema:     Peripheral edema absent.   Skin:     General: Skin is warm and dry.   Neurological:      General: No focal deficit present.      Mental Status: Alert, oriented to person, place, and time and oriented to person, place and time.   Psychiatric:         Mood and Affect: Mood and affect normal.         Lab Review:       BUN   Date Value Ref Range Status   12/01/2022 11 6 - 20 mg/dL Final   11/14/2022 14 10 - 20 mg/dL Final "     Creatinine   Date Value Ref Range Status   12/01/2022 0.67 0.57 - 1.00 mg/dL Final   11/14/2022 0.64 0.55 - 1.02 mg/dL Final     Potassium   Date Value Ref Range Status   12/01/2022 5.1 3.5 - 5.2 mmol/L Final   11/14/2022 4 3.5 - 5.1 mmol/L Final     ALT (SGPT)   Date Value Ref Range Status   12/01/2022 47 (H) 1 - 33 U/L Final     AST (SGOT)   Date Value Ref Range Status   12/01/2022 32 1 - 32 U/L Final         Performed        Assessment:          Diagnosis Plan   1. Preop cardiovascular exam  ECG 12 Lead      2. Elevated blood pressure reading in office without diagnosis of hypertension  ECG 12 Lead      3. S/P percutaneous patent foramen ovale closure  Adult Transthoracic Echo Complete w/ Color, Spectral and Contrast if necessary per protocol             Plan:         Preoperative cardiovascular risk assessment for upcoming cosmetic surgery: She is low risk for preoperative M ACE.  She is functional, active, and her ECG is normal.  She does not meet guideline recommendations for additional cardiac testing at this time.  She may proceed with surgery without additional cardiac testing.  She is not on anticoagulants.  History of PFO closure: Sounds that this was a TIA-related event.  Operative details are not available to review due to age.  Will arrange for an echocardiogram to establish a baseline.  If it looks good, then given how long ago the PFO closure was I doubt she will need further echocardiograms we will see what the echo shows.  This echo has no bearing on her surgical risk.     Thank you for allowing me to participate in the care of Sharmin Ren. Feel free to contact me directly with any further questions or concerns.    RTC 1 year, echo in the interim    Yohannes Worrell MD  [facility]  01/02/25  08:11 EST       Current Outpatient Medications:   •  buPROPion XL (WELLBUTRIN XL) 300 MG 24 hr tablet, Take 300 mg by mouth Daily., Disp: , Rfl:   •  estradiol (ESTRACE) 0.5 MG tablet, Take  0.5 mg by mouth Daily., Disp: , Rfl:   •  Progesterone (PROMETRIUM) 100 MG capsule, Take 100 mg by mouth Daily., Disp: , Rfl:          Return in about 1 year (around 1/2/2026).      Part of this note may be an electronic transcription/translation of spoken language to printed text using the Dragon Dictation System.